# Patient Record
(demographics unavailable — no encounter records)

---

## 2024-11-12 NOTE — REASON FOR VISIT
[Follow-Up Visit] : a follow-up visit for [FreeTextEntry2] : s/p ORIF left distal humerus, DOS: 12/4/23.

## 2024-11-12 NOTE — PHYSICAL EXAM
[de-identified] : The patient is sitting comfortably in the exam room. LEFT arm. -Skin is intact, no swelling, no ecchymosis -Incision is well-healed, no erythema, no signs of infection -No tenderness to palpation medially or laterally -No pain with palpation over the radial head with range of motion -The plate is palpable at the most distal and lateral aspect of the plate as it is directly subcutaneous. -Range of motion elbow 0-130 -Pronation 90, supination 90 -Stable to varus and valgus stress -Able to make a fist -Sensation is intact median, radial, ulnar, axillary nerves -Motor is intact median, radial, ulnar, axillary nerves -Hand is warm and well-perfused, Palpable radial and ulnar pulses. [de-identified] : X-rays of the left elbow were taken in the office today, 11/13/24. AP, Oblique and Lateral. X-rays show excellent overall alignment of the humerus. Implants are in good position. There is interval healing at the fracture site.  The fracture is completely healed and remodeled.

## 2024-11-12 NOTE — DISCUSSION/SUMMARY
[de-identified] : 18-year-old gentleman s/p ORIF left distal humerus, approximately 11 months out, doing great.  -X-ray and physical exam findings were discussed with the patient. -WBAT left UE -Physical therapy: work on ROM of the left elbow -Follow up as needed with x-rays of the left elbow at that time. -All the patient's questions and concerns were addressed during this visit.

## 2024-11-12 NOTE — HISTORY OF PRESENT ILLNESS
[de-identified] : Mr. MARTI SUAREZ is a 18 year old gentleman presenting for follow up appointment s/p ORIF left distal humerus on 12/4/23. Since his last visit he states he is feeling